# Patient Record
Sex: FEMALE | Race: WHITE
[De-identification: names, ages, dates, MRNs, and addresses within clinical notes are randomized per-mention and may not be internally consistent; named-entity substitution may affect disease eponyms.]

---

## 2021-06-19 PROBLEM — Z00.00 ENCOUNTER FOR PREVENTIVE HEALTH EXAMINATION: Status: ACTIVE | Noted: 2021-06-19

## 2021-11-29 ENCOUNTER — NON-APPOINTMENT (OUTPATIENT)
Age: 66
End: 2021-11-29

## 2021-11-29 DIAGNOSIS — B00.9 HERPESVIRAL INFECTION, UNSPECIFIED: ICD-10-CM

## 2022-01-25 RX ORDER — VALACYCLOVIR 500 MG/1
500 TABLET, FILM COATED ORAL DAILY
Qty: 90 | Refills: 1 | Status: ACTIVE | OUTPATIENT
Start: 2022-01-25

## 2022-03-28 ENCOUNTER — APPOINTMENT (OUTPATIENT)
Dept: OBGYN | Facility: CLINIC | Age: 67
End: 2022-03-28
Payer: MEDICARE

## 2022-03-28 ENCOUNTER — NON-APPOINTMENT (OUTPATIENT)
Age: 67
End: 2022-03-28

## 2022-03-28 VITALS
SYSTOLIC BLOOD PRESSURE: 142 MMHG | WEIGHT: 160 LBS | BODY MASS INDEX: 25.71 KG/M2 | DIASTOLIC BLOOD PRESSURE: 72 MMHG | HEART RATE: 80 BPM | HEIGHT: 66 IN | TEMPERATURE: 97.2 F

## 2022-03-28 DIAGNOSIS — Z01.419 ENCOUNTER FOR GYNECOLOGICAL EXAMINATION (GENERAL) (ROUTINE) W/OUT ABNORMAL FINDINGS: ICD-10-CM

## 2022-03-28 PROCEDURE — G0101: CPT

## 2022-03-28 RX ORDER — ESTRADIOL 0.1 MG/G
0.1 CREAM VAGINAL
Qty: 1 | Refills: 3 | Status: ACTIVE | COMMUNITY
Start: 2022-03-28 | End: 1900-01-01

## 2022-03-28 NOTE — HISTORY OF PRESENT ILLNESS
[FreeTextEntry1] : here for her annual visit\par had severe dyspareunia...vag dryness\par \par last HUSSAIN VISIT:\par \par \par    	Print\par Patient\par Name	EDUARDO MALLOY (67yo, F) ID# [id number]	Appt. Date/Time	2021 10:00AM\par 	1955	Service Dept.	[facility] BK OFFICE\par Provider	ROSANNE MAST MD\par Insurance	\par Med Primary: [employer] (PPO)\par Insurance # : [id number]\par Policy/Group # : [id number]\par Prescription: [facility] - Member is eligible. details\par Prescription: [facility] - Member is eligible. details\par Prescription: [facility] - Member is eligible. details\par Chief Complaint\par Well Woman Visit\par Patient's Care Team\par Notes: none\par Patient's Pharmacies\par [facility] (MAIL-ORDER, ERX): [address] [place] [address], Ph [phone], Fax [phone]\par [id number] (ERX): [address] [place] [address], Ph [phone], Fax [phone]\par Vitals\par 2021 10:02 am\par Ht:	5 ft 7 in\par Wt:	228 lbs\par BMI:	35.7\par BP:	132/80 sitting\par Allergies\par Reviewed Allergies\par CODEINE	\par ERYTHROMYCIN BASE	\par LATEX	\par Medications\par Reviewed Medications\par alendronate 70 mg tablet\par TAKE ONE TABLET ONCE WEEKLY WITH A FULL GLASS OF WATER ON AN EMPTY STOMACH. DO NOT EAT DRINK OR LIE DOWN FOR NEXT 30 MINUTES\par 21   filled	[facility]\par amLODIPine 2.5 mg tablet\par TAKE ONE TABLET BY MOUTH EVERY DAY\par 06/10/20   filled	[facility]\par amLODIPine 5 mg tablet\par TAKE ONE TABLET BY MOUTH EVERY DAY\par 21   filled	[facility]\par amoxicillin 500 mg capsule\par TAKE ONE CAPSULE BY MOUTH EVERY 8 HOURS UNTIL GONE\par 20   filled	[facility]\par amoxicillin 500 mg-potassium clavulanate 125 mg tablet\par TAKE ONE TABLET BY MOUTH EVERY 12 HOURS FOR 7 DAYS\par 20   filled	[facility]\par amoxicillin 875 mg-potassium clavulanate 125 mg tablet\par 16   filled	[facility]\par atorvastatin 10 mg tablet\par 21   filled	[facility]\par benzonatate 100 mg capsule\par 16   filled	[facility]\par cephALEXin 500 mg capsule\par 18   filled	[facility]\par clindamycin 2 % vaginal cream\par Insert 1 applicator(s)ful every day by vaginal route at bedtime for 7 days.\par 16   filled	[facility]\par clobetasoL 0.05 % topical ointment\par Apply 1 application(s) twice a week by topical route as directed for 180 days.\par 18   filled	[facility]\par clotrimazole-betamethasone 1 %-0.05 % topical cream\par Apply 2 g twice a day by topical route as directed for 7 days.\par 08/15/16   filled	[facility]\par estradioL 0.01% (0.1 mg/gram) vaginal cream\par Insert 1 g twice a week by vaginal route at bedtime for 90 days.\par 20   filled	[facility]\par fluconazole 200 mg tablet\par Take 1 tablet(s) every day by oral route as directed for 3 days.\par 08/15/16   filled	[facility]\par mometasone 0.1 % topical ointment\par 19   filled	[facility]\par penicillin V potassium 500 mg tablet\par 17   filled	[facility]\par Prempro 0.3 mg-1.5 mg tablet\par Take 1 tablet(s) every day by oral route for 90 days.\par 11/15/20   filled	[facility]\par rosuvastatin 5 mg tablet\par 09/15/20   filled	[facility]\par valACYclovir 500 mg tablet\par Take 1 tablet(s) every day by oral route as directed for 90 days.\par 21   prescribed	Rosanne Mast MD\par \par HORMONE REPLACMENT BIOIDENTICAL\par Problems\par Reviewed Problems\par Herpesvirus infection - Onset: 2017\par Uterine leiomyoma - Onset: 2017\par Pain in pelvis - CRAMPS\par Vaginitis and vulvovaginitis\par Menopausal and postmenopausal disorders\par Acute vulvitis\par HPV - Human papillomavirus test positive\par Cervicovaginal cytology: Low grade squamous intraepithelial lesion\par Atypical squamous cells of undetermined significance on cervical Papanicolaou smear\par Abnormal cervical Papanicolaou smear with positive human papillomavirus deoxyribonucleic acid test\par Stenosis of cervix\par Family History\par Reviewed Family History\par Non-contributory.\par Mother	- Diabetes mellitus\par Father	- Hypertensive disorder\par Social History\par Reviewed Social History\par Tobacco Smoking Status: Former smoker (Notes: quit 20+ y/ago)\par Most Recent Tobacco Use Screenin2021\par Surgical History\par Reviewed Surgical History\par Laparoscopy - ENDOMETRIOSIS\par Other - COSMETIC\par Dilation and Curettage\par Other - FOOT SX\par GYN History\par Reviewed GYN History\par LMP: Approximate.\par Date of LMP: (Notes: 46 Y/OLD()).\par Obstetric History\par Reviewed Obstetric History\par TOTAL	FULL	PRE	AB. I	AB. S	ECTOPICS	MULTIPLE	LIVING\par 1				1			\par Past Medical History\par Past Medical History not reviewed (last reviewed 08/15/2016)\par Screening\par None recorded.\par HPI\par routine gyn exam\par ROS\par Patient reports incontinence but reports no hematuria, no abnormal bleeding, no flank pain, no trouble urinating, no rash, no lesion, no discharge, no vaginal odor, and no vaginal itching. She reports no fatigue, no fever, no significant weight gain, and no significant weight loss. She reports no abnormal moles and no rashes. She reports no irritation and no vision changes. She reports no hearing loss, no ear pain, no nose/sinus problems, no sore throat, no snoring, no dry mouth, and no mouth ulcers. She reports no dyspnea / shortness of breath, no cough, no sputum production, no hemoptysis, and no wheezing. She reports no chest pain, no palpitations, and no orthopnea. She reports no heartburn, no dysphagia, no nausea, no vomiting, no abdominal pain, no bowel movement changes, no diarrhea, no constipation, and no rectal bleeding. She reports no menstrual problems and no PMDD symptoms. She reports no menopausal symptoms. She reports no sexual problems. She reports no muscle aches, no muscle weakness, no arthralgias/joint pain, and no back pain. She reports no headaches, no dizziness, no LOC, no weakness, no numbness, and no seizures. She reports no depression, no alcoholism, and no sleep disturbances.\par Physical Exam\par Patient is a 66-year-old female.\par \par Chaperone: Chaperone: present.\par \par Female Genitalia: Vulva: no masses, atrophy, or lesions. Bladder/Urethra: no urethral discharge or mass and normal meatus and bladder non distended. Vagina no tenderness, erythema, rectocele, abnormal vaginal discharge, or vesicle(s) or ulcers and cystocele. Cervix: no discharge or cervical motion tenderness and grossly normal. Uterus: midline, mobile, non-tender, normal shape, no uterine prolapse, and enlarged. Adnexa/Parametria: no parametrial tenderness or mass and no adnexal tenderness or ovarian mass.\par \par Breast: Inspection/Palpation: no erythema, induration, tenderness, skin changes, abnormal secretions, or distinct masses and normal nipple appearance and non tender axillary lymph nodes.\par \par Abdomen: Auscultation/Inspection/Palpation: no tenderness, hepatomegaly, splenomegaly, masses, or CVA tenderness and soft, non-distended, and normal bowel sounds. Hernia: none palpated.\par Assessment / Plan\par 1. Abnormal cervical Papanicolaou smear with positive human papillomavirus deoxyribonucleic acid test\par R87.619: Unspecified abnormal cytological findings in specimens from cervix uteri\par \par 2. Atypical squamous cells of undetermined significance on cervical Papanicolaou smear - PAP\par R87.610: Atypical squamous cells of undetermined significance on cytologic smear of cervix (ASC-US)\par \par 3. Cervicovaginal cytology: Low grade squamous intraepithelial lesion -\par REPEAT PAPS Q 6 MTHS\par \par SONO, SMALL FIBROID\par \par NO CYSTS\par \par R87.612: Low grade squamous intraepithelial lesion on cytologic smear of cervix (LGSIL)\par \par 4. Menopausal and postmenopausal disorders -\par stop prempro\par \par N95.9: Unspecified menopausal and perimenopausal disorder\par \par 5. Dyspareunia -\par DRYNESS\par \par N94.10: Unspecified dyspareunia\par \par 6. HPV - Human papillomavirus test positive - PAP\par R87.810: Cervical high risk human papillomavirus (HPV) DNA test positive\par \par 7. Uterine leiomyoma -\par small\par \par OBSERVE\par \par D25.9: Leiomyoma of uterus, unspecified\par \par 8. Genital herpes simplex -\par CONTINUE : LIVER ENZYMES ARE STILL OK AS PER PATIENT\par \par A60.9: Anogenital herpesviral infection, unspecified\par valacyclovir 500 mg tablet - Take 1 tablet(s) every day by oral route as directed for 90 days.     Qty: 1 bottle(s) of 90     Refills: 3     Pharmacy: [facility]\par \par 9. Gynecologic examination\par Z01.419: Encounter for gynecological examination (general) (routine) without abnormal findings\par PAP, LB\par MAMMO, SCREENING, BILATERAL\par \par 10. Urinary incontinence -\par POLLAND\par \par R32: Unspecified urinary incontinence\par UROGYNECOLOGY REFERRAL -     Schedule Within: provider's discretion\par \par 11. Mastodynia of left breast -\par KAMRAN\par \par HAD STOPPED HRT\par \par CANNOT RESTART UNLESS KAMRAN APPROVES\par \par N64.4: Mastodynia\par \par \par Return to Office\par None recorded.\par Encounter Sign-Off\par Encounter signed-off by Rosanne Mast MD, 2021.\par Encounter performed and documented by Rosanne Mast MD\par Encounter reviewed & signed by Rosanne Mast MD on 2021 at 10:22am

## 2022-03-28 NOTE — PHYSICAL EXAM
sin [Examination Of The Breasts] : a normal appearance [No Masses] : no breast masses were palpable [Labia Majora] : normal [Labia Minora] : normal [Normal] : normal [Uterine Adnexae] : normal

## 2022-04-02 LAB — HPV HIGH+LOW RISK DNA PNL CVX: NOT DETECTED

## 2022-04-09 LAB — CYTOLOGY CVX/VAG DOC THIN PREP: NORMAL

## 2022-05-03 ENCOUNTER — NON-APPOINTMENT (OUTPATIENT)
Age: 67
End: 2022-05-03

## 2022-05-03 RX ORDER — ESTRADIOL 0.1 MG/G
0.1 CREAM VAGINAL
Qty: 3 | Refills: 2 | Status: ACTIVE | COMMUNITY
Start: 2022-05-03 | End: 1900-01-01

## 2022-05-03 RX ORDER — VALACYCLOVIR 500 MG/1
500 TABLET, FILM COATED ORAL DAILY
Qty: 90 | Refills: 1 | Status: ACTIVE | COMMUNITY
Start: 2022-05-03 | End: 1900-01-01

## 2022-10-03 ENCOUNTER — APPOINTMENT (OUTPATIENT)
Dept: OBGYN | Facility: CLINIC | Age: 67
End: 2022-10-03

## 2022-10-03 VITALS — TEMPERATURE: 97.2 F

## 2022-10-03 VITALS
BODY MASS INDEX: 26.52 KG/M2 | SYSTOLIC BLOOD PRESSURE: 134 MMHG | HEIGHT: 66 IN | WEIGHT: 165 LBS | DIASTOLIC BLOOD PRESSURE: 91 MMHG | HEART RATE: 82 BPM

## 2022-10-03 DIAGNOSIS — N94.10 UNSPECIFIED DYSPAREUNIA: ICD-10-CM

## 2022-10-03 DIAGNOSIS — N95.2 POSTMENOPAUSAL ATROPHIC VAGINITIS: ICD-10-CM

## 2022-10-03 PROCEDURE — 76830 TRANSVAGINAL US NON-OB: CPT

## 2022-10-03 PROCEDURE — 99213 OFFICE O/P EST LOW 20 MIN: CPT | Mod: 25

## 2022-10-03 NOTE — PROCEDURE
[Transvaginal Ultrasound] : transvaginal ultrasound [FreeTextEntry3] : 2 SMALL MYOMAS\par CERVIX AND OVARIES NORMAL\par NO FREE FLUID [FreeTextEntry5] : 25.2 CC,  2 MYOMAS:  4.3 CC POSTERIOR FUNDUS,  0.7CC CERVIX ENDOMETRIUM 1 MM [FreeTextEntry7] : 0.9CC [FreeTextEntry8] : 1.1CC

## 2022-10-03 NOTE — HISTORY OF PRESENT ILLNESS
[Hot Flashes] : hot flashes [Night Sweats] : night sweats [Difficulty with [place]] : difficulty with intercourse [FreeTextEntry1] : menopausal symptoms have worsened since she stopped her hrt....which she stopped after 5 years.\vinny Is having a breast consult in 3 weeks and will address it with her breast consultant...if OK with him, will resume it

## 2022-10-19 DIAGNOSIS — Z76.0 ENCOUNTER FOR ISSUE OF REPEAT PRESCRIPTION: ICD-10-CM

## 2022-10-28 ENCOUNTER — NON-APPOINTMENT (OUTPATIENT)
Age: 67
End: 2022-10-28

## 2022-11-17 ENCOUNTER — NON-APPOINTMENT (OUTPATIENT)
Age: 67
End: 2022-11-17

## 2023-04-15 ENCOUNTER — RX RENEWAL (OUTPATIENT)
Age: 68
End: 2023-04-15

## 2023-04-28 ENCOUNTER — NON-APPOINTMENT (OUTPATIENT)
Age: 68
End: 2023-04-28

## 2023-04-29 RX ORDER — VALACYCLOVIR 500 MG/1
500 TABLET, FILM COATED ORAL DAILY
Qty: 90 | Refills: 1 | Status: ACTIVE | COMMUNITY
Start: 2022-10-19 | End: 1900-01-01

## 2023-05-08 ENCOUNTER — APPOINTMENT (OUTPATIENT)
Dept: OBGYN | Facility: CLINIC | Age: 68
End: 2023-05-08

## 2023-08-28 ENCOUNTER — APPOINTMENT (OUTPATIENT)
Dept: OBGYN | Facility: CLINIC | Age: 68
End: 2023-08-28
Payer: MEDICARE

## 2023-08-28 VITALS
WEIGHT: 172 LBS | HEIGHT: 66 IN | SYSTOLIC BLOOD PRESSURE: 138 MMHG | HEART RATE: 63 BPM | BODY MASS INDEX: 27.64 KG/M2 | DIASTOLIC BLOOD PRESSURE: 80 MMHG

## 2023-08-28 DIAGNOSIS — Z87.891 PERSONAL HISTORY OF NICOTINE DEPENDENCE: ICD-10-CM

## 2023-08-28 DIAGNOSIS — N95.9 UNSPECIFIED MENOPAUSAL AND PERIMENOPAUSAL DISORDER: ICD-10-CM

## 2023-08-28 PROCEDURE — 99213 OFFICE O/P EST LOW 20 MIN: CPT

## 2023-08-28 RX ORDER — CONJUGATED ESTROGENS AND MEDROXYPROGESTERONE ACETATE .3; 1.5 MG/1; MG/1
0.3-1.5 TABLET, SUGAR COATED ORAL
Qty: 84 | Refills: 3 | Status: ACTIVE | COMMUNITY
Start: 2022-11-17 | End: 1900-01-01

## 2023-08-28 NOTE — HISTORY OF PRESENT ILLNESS
[FreeTextEntry1] : had stopped the HRT after 5 years and the symptoms were horrible....so we re-started it with the OK from the breast team she feels well and offers no complaints

## 2023-08-28 NOTE — PHYSICAL EXAM
[Chaperone Present] : A chaperone was present in the examining room during all aspects of the physical examination [Examination Of The Breasts] : a normal appearance [No Masses] : no breast masses were palpable [Labia Majora] : normal [Labia Minora] : normal [Atrophy] : atrophy [Normal] : normal [Uterine Adnexae] : normal

## 2024-01-24 ENCOUNTER — RX RENEWAL (OUTPATIENT)
Age: 69
End: 2024-01-24

## 2024-01-24 RX ORDER — VALACYCLOVIR 500 MG/1
500 TABLET, FILM COATED ORAL DAILY
Qty: 90 | Refills: 3 | Status: ACTIVE | COMMUNITY
Start: 2021-11-29 | End: 1900-01-01

## 2024-09-06 DIAGNOSIS — Z12.39 ENCOUNTER FOR OTHER SCREENING FOR MALIGNANT NEOPLASM OF BREAST: ICD-10-CM

## 2024-09-09 ENCOUNTER — APPOINTMENT (OUTPATIENT)
Dept: OBGYN | Facility: CLINIC | Age: 69
End: 2024-09-09
Payer: MEDICARE

## 2024-09-09 VITALS
BODY MASS INDEX: 27 KG/M2 | HEIGHT: 66 IN | SYSTOLIC BLOOD PRESSURE: 133 MMHG | HEART RATE: 63 BPM | DIASTOLIC BLOOD PRESSURE: 80 MMHG | WEIGHT: 168 LBS

## 2024-09-09 DIAGNOSIS — B00.9 HERPESVIRAL INFECTION, UNSPECIFIED: ICD-10-CM

## 2024-09-09 DIAGNOSIS — N95.9 UNSPECIFIED MENOPAUSAL AND PERIMENOPAUSAL DISORDER: ICD-10-CM

## 2024-09-09 DIAGNOSIS — Z87.891 PERSONAL HISTORY OF NICOTINE DEPENDENCE: ICD-10-CM

## 2024-09-09 DIAGNOSIS — N94.10 UNSPECIFIED DYSPAREUNIA: ICD-10-CM

## 2024-09-09 DIAGNOSIS — Z01.419 ENCOUNTER FOR GYNECOLOGICAL EXAMINATION (GENERAL) (ROUTINE) W/OUT ABNORMAL FINDINGS: ICD-10-CM

## 2024-09-09 LAB
BILIRUB UR QL STRIP: NORMAL
CLARITY UR: CLEAR
COLLECTION METHOD: NORMAL
GLUCOSE UR-MCNC: NORMAL
HCG UR QL: 0.2 EU/DL
HGB UR QL STRIP.AUTO: NORMAL
KETONES UR-MCNC: NORMAL
LEUKOCYTE ESTERASE UR QL STRIP: NORMAL
NITRITE UR QL STRIP: NORMAL
PH UR STRIP: 5.5
PROT UR STRIP-MCNC: NORMAL
SP GR UR STRIP: 1.03

## 2024-09-09 PROCEDURE — 81003 URINALYSIS AUTO W/O SCOPE: CPT | Mod: QW

## 2024-09-09 PROCEDURE — G0101: CPT

## 2024-09-09 RX ORDER — ATORVASTATIN CALCIUM 80 MG/1
TABLET, FILM COATED ORAL
Refills: 0 | Status: ACTIVE | COMMUNITY

## 2024-09-09 RX ORDER — AMLODIPINE BESYLATE 5 MG/1
TABLET ORAL
Refills: 0 | Status: ACTIVE | COMMUNITY

## 2024-09-09 RX ORDER — TIRZEPATIDE 2.5 MG/.5ML
2.5 INJECTION, SOLUTION SUBCUTANEOUS
Refills: 0 | Status: ACTIVE | COMMUNITY

## 2024-09-09 NOTE — PHYSICAL EXAM
[Chaperone Present] : A chaperone was present in the examining room during all aspects of the physical examination [19039] : A chaperone was present during the pelvic exam. [Labia Majora] : normal [Labia Minora] : normal [Atrophy] : atrophy [Uterine Adnexae] : normal [Examination Of The Breasts] : a normal appearance [Normal] : normal [No Masses] : no breast masses were palpable [FreeTextEntry2] : Charisse

## 2024-09-09 NOTE — HISTORY OF PRESENT ILLNESS
[Patient reported mammogram was normal] : Patient reported mammogram was normal [Patient reported breast sonogram was normal] : Patient reported breast sonogram was normal [Patient reported PAP Smear was normal] : Patient reported PAP Smear was normal [Difficulty with [place]] : difficulty with intercourse [Yes] : Patient has concerns regarding sex [Currently Active] : currently active [Men] : men [Vaginal] : vaginal [FreeTextEntry1] : Rachelle is a 68 yo here for her annual.  At her annual last year, she was restarted on HRT. She has been taking the regimen as prescribed and reports no hot flashes or night sweats, she feels well.  She reports painful intercourse. She has tried some OTC creams but does not report improvement.  Otherwise, she feels well. [Mammogramdate] : 11/2023 [BreastSonogramDate] : 11/2023 [PapSmeardate] : 03/2022

## 2024-09-12 ENCOUNTER — RX RENEWAL (OUTPATIENT)
Age: 69
End: 2024-09-12

## 2024-09-14 LAB
CYTOLOGY CVX/VAG DOC THIN PREP: NORMAL
HPV HIGH+LOW RISK DNA PNL CVX: NOT DETECTED

## 2025-09-15 ENCOUNTER — APPOINTMENT (OUTPATIENT)
Dept: OBGYN | Facility: CLINIC | Age: 70
End: 2025-09-15
Payer: MEDICARE

## 2025-09-15 VITALS
HEIGHT: 66 IN | WEIGHT: 164 LBS | SYSTOLIC BLOOD PRESSURE: 143 MMHG | BODY MASS INDEX: 26.36 KG/M2 | DIASTOLIC BLOOD PRESSURE: 86 MMHG | HEART RATE: 66 BPM

## 2025-09-15 DIAGNOSIS — N94.10 UNSPECIFIED DYSPAREUNIA: ICD-10-CM

## 2025-09-15 DIAGNOSIS — B00.9 HERPESVIRAL INFECTION, UNSPECIFIED: ICD-10-CM

## 2025-09-15 DIAGNOSIS — N95.9 UNSPECIFIED MENOPAUSAL AND PERIMENOPAUSAL DISORDER: ICD-10-CM

## 2025-09-15 DIAGNOSIS — N95.2 POSTMENOPAUSAL ATROPHIC VAGINITIS: ICD-10-CM

## 2025-09-15 LAB
BILIRUB UR QL STRIP: NORMAL
CLARITY UR: CLEAR
COLLECTION METHOD: NORMAL
GLUCOSE UR-MCNC: NORMAL
HCG UR QL: 0.2 EU/DL
HGB UR QL STRIP.AUTO: NORMAL
KETONES UR-MCNC: ABNORMAL
LEUKOCYTE ESTERASE UR QL STRIP: ABNORMAL
NITRITE UR QL STRIP: NORMAL
PH UR STRIP: 5.5
PROT UR STRIP-MCNC: ABNORMAL
SP GR UR STRIP: 1.02

## 2025-09-15 PROCEDURE — 99213 OFFICE O/P EST LOW 20 MIN: CPT

## 2025-09-15 PROCEDURE — 99459 PELVIC EXAMINATION: CPT

## 2025-09-15 PROCEDURE — 81003 URINALYSIS AUTO W/O SCOPE: CPT | Mod: QW

## 2025-09-15 RX ORDER — TIRZEPATIDE 15 MG/.5ML
INJECTION, SOLUTION SUBCUTANEOUS
Refills: 0 | Status: ACTIVE | COMMUNITY